# Patient Record
(demographics unavailable — no encounter records)

---

## 2025-05-14 NOTE — PLAN
[FreeTextEntry4] : PLAN: -psychoeducation about diagnosis and treatment modalities, alternatives to recommended treatment, risk Vs benefits of treatment and no treatment and alternative treatments. -Recommendation for ADOS evaluation (R/O ASD) with neuropsychology.  -parents already have a scheduled sleep study in June ; sleep hygiene reviewed , consideration for oral appliance  - recommend Behavior therapy for ADHD EF skills, consideration to add social skills group/social pragmatic skills to 504   -Medication: parents not interested in med mgmt at this time. Will discuss consideration for stimulants *MPH) to address adhd and daytime sleepiness.  -Further labs: [place] forms and sleep study. -Safety: Emergency procedures were discussed  -Patient, parent  given opportunity to ask questions and their questions were answered and they expressed understanding and agreement with above plan. -Follow up: RTC in 2-3 weeks for feedback session.        I spent a total of 60 minutes for today's visit in evaluating and treating the patient as per above, including: preparing for patient's appointment (review of prior documents); obtaining and reviewing separately obtained history; performing a medically necessary exam/evaluation; independently interpreting results (labs/Health and Behavior Assessments); communicating/counseling/educating the patient/family/caregiver;  referring to other health care professionals; documenting clinical information in the EHR

## 2025-05-14 NOTE — DISCUSSION/SUMMARY
[FreeTextEntry1] : 14 yo F with ADHD, Anxiety, and sleep concerns; Warrants r/o ASD and sleep d/o; Protective factors of supportive family and friends, looks to treatment favorably, as such with treatment adherence, prognosis is good.  [Low acute suicide risk] : Low acute suicide risk [No] : No [Not clinically indicated] : Safety Plan completed/updated (for individuals at risk): Not clinically indicated

## 2025-05-14 NOTE — RISK ASSESSMENT
[Clinical Records] : Clinical Records [Clinical Interview] : Clinical Interview [No] : No [Supportive social network of family or friends] : supportive social network of family or friends [Positive therapeutic relationships] : positive therapeutic relationships [Responsibility to children, family, or others] : responsibility to children, family, or others [Engaged in work or school] : engaged in work or school

## 2025-05-14 NOTE — REASON FOR VISIT
[Self-Referred] : Self-Referred [Patient] : Patient [Prior Medical Records] : Prior Medical Records [Collateral - Name/Contact Info/Relationship:___] : Collateral: [unfilled] [FreeTextEntry2] : adhd , anxiety evaluation  [FreeTextEntry1] : adhd , anxiety evaluation

## 2025-05-14 NOTE — SOCIAL HISTORY
[FreeTextEntry1] : lives with bio parents, only child; pets- dogs and fish mother- anesthesiologist  father- part time  and      8th grade, advanced/gifted classes

## 2025-05-14 NOTE — PAST MEDICAL HISTORY
[FreeTextEntry1] : -35 weeks; 4 lbs, 1 week NICU    -scoliosis, monitoring only at this time  -ADHD as per hpi

## 2025-05-14 NOTE — PLAN
Yes [FreeTextEntry4] : PLAN: -psychoeducation about diagnosis and treatment modalities, alternatives to recommended treatment, risk Vs benefits of treatment and no treatment and alternative treatments. -Recommendation for ADOS evaluation (R/O ASD) with neuropsychology.  -parents already have a scheduled sleep study in June ; sleep hygiene reviewed , consideration for oral appliance  - recommend Behavior therapy for ADHD EF skills, consideration to add social skills group/social pragmatic skills to 504   -Medication: parents not interested in med mgmt at this time. Will discuss consideration for stimulants *MPH) to address adhd and daytime sleepiness.  -Further labs: [place] forms and sleep study. -Safety: Emergency procedures were discussed  -Patient, parent  given opportunity to ask questions and their questions were answered and they expressed understanding and agreement with above plan. -Follow up: RTC in 2-3 weeks for feedback session.        I spent a total of 60 minutes for today's visit in evaluating and treating the patient as per above, including: preparing for patient's appointment (review of prior documents); obtaining and reviewing separately obtained history; performing a medically necessary exam/evaluation; independently interpreting results (labs/Health and Behavior Assessments); communicating/counseling/educating the patient/family/caregiver;  referring to other health care professionals; documenting clinical information in the EHR

## 2025-05-14 NOTE — DISCUSSION/SUMMARY
[FreeTextEntry1] : 12 yo F with ADHD, Anxiety, and sleep concerns; Warrants r/o ASD and sleep d/o; Protective factors of supportive family and friends, looks to treatment favorably, as such with treatment adherence, prognosis is good.  [Low acute suicide risk] : Low acute suicide risk [No] : No [Not clinically indicated] : Safety Plan completed/updated (for individuals at risk): Not clinically indicated

## 2025-05-14 NOTE — HISTORY OF PRESENT ILLNESS
[FreeTextEntry1] : Patient is a 12 yo female, domiciled with bio parents, only child, currently enrolled at [employer], 8th grade gifted/advanced classed ,  with 504 accommodations for ADHD-I,  currently not outpatient treatment, no prior psychiatric hospitalization, no self-injury or suicide attempts, no aggression/violence, no substance use, no legal issues, no CPS involvement, no trauma/abuse, no sig PMH, presenting today with parents for second opinion evaluation of adhd and anxiety.  Parents explain child is bright, however struggles with attention and organizational skills.  They find as a result with often have secondary anxiety related to missed or late assigment completion; Mother feels child needs help with motivation and ongoing reminders.  These behaviors were first noted around 6th grade.  Child was evaluated by school psychiatrist and told met "soft" ADH-I; As a results 504 was initiated with help of extra time.  Pt often attended summer school in order to take advanced classes, and performed well with less stressors over the summer; During the academic year, has difficulty organizing her time.  Socially, parents think child has some close friendships, however has had several experiences when she came home to explain she didn't understand why peers behaved in a certain way; She has a tendency to hyperfixate and learn all about a particular subject which she then speaks to friends and family about; Pt also has several sensory concerns since early childhood, didnt likeloud sounds, would put her hands over her ears during firedrills; Often would cry when felt overstimlated; Lately allowed to wear headphones prn and have a plan with the school that allows them to notify child prior to drills in order to help reduce distress.  Child recently had an assigned reading involving a character with ASD, pt felt she identified with the character and started to inquire if she too was mildly autistic, parents are open to ADOS evaluation. Intake with pt: sitting with parents wearing noise cancelling headphones, responds to instruction to take them off, pt explains she finds it "comforting", adds she has other comfort and fidgets she often uses (security blanket, stinkray theme fidgets); Pt does speak tangentially regarding her love for stingrays or [place] mythology; States she speaks incessantly about these two subjects lately with friends;  Describes she is able to maintain friendships with few close friends who have similar interests; Has often felt confused that she's not able to understand others' social cues, and prefers to wait till she comes home to clarify with parents why peers behave rude to her or speak rudely to each other or teachers.  She does need more help with math lately, however avoids going for extra help as other children present and not a structured setting, feels doesn't know how to ask for 1:1 help;  Thinks her appetite is okay and attending to ADL, does well academically; Struggles lately with sleep onset and middle insomnia, then often falling asleep in class; Has a pending appt with sleep specialist for sleep study.  Psych ROS: Pt denies hx of trauma/MDD/SIIP/self harm/psychosis/ED/OCD  Inattention Sx: Patient reports making a lot of careless mistakes, difficulty sustaining attention on tasks or play activities, told doesn't listen, difficulty following instructions, difficulty organizing tasks , dislikes/avoids tasks requiring attention , loses things ("all the time"),   easily distracted, forgetful in daily activities and chores. Hyperactivity Sx: Figets, difficulty playing quietly ,   talks excessively,  difficulty waiting turn , often interrupts or intrudes   .  ASD screen: Persistent deficits in social communication and social interaction- Yes       1. Deficits in social-emotional reciprocity (abnormal social approach,   reduced sharing of interests, emotions, affect, failure to initiate or respond to social interactions; "Problems with social interactions,  can't tell sarcasm, managing my emotions like snapping at friends"       2.Deficits in nonverbal communication used for social interaction (poorly integrated verbal and nonverbal communication, abnormalities in eye contact and body language, deficits in understanding and use of gestures) - Yes, "understand other people's social cues"  Sensory concerns- textures like micofiber,   avoid onion celery tomatoes, beans - "texture is itchy" ; uses headphones so hypersensitivity to sound, uses a comfort blanket, has transitional object fidgets; avoids any pill swallowing  [FreeTextEntry2] : 6th grade "potential ADHD - Inattentive " 8th grade ADHD-I, Anxiety unspecified    [FreeTextEntry3] : melatonin - briefly, not effective, considering trazodone however pt does not swallow pills so did not yet try.

## 2025-05-14 NOTE — PHYSICAL EXAM
[Well groomed] : well groomed [Intermittent] : intermittent [Stereotyped/Peculiar] : stereotyped/peculiar [Cooperative] : cooperative [Euthymic] : euthymic [Anxious] : anxious [Constricted] : constricted [Clear] : clear [Loud] : loud [Linear/Goal Directed] : linear/goal directed [Tangential] : tangential [[place]] : concrete [None] : none [Preoccupations/Ruminations] : preoccupations/ruminations [None Reported] : none reported [Attention/Concentration] : attention/concentration [Above average] : above average [WNL] : within normal limits [Positive interaction] : positive interaction [Unremarkable/age appropriate] : unremarkable/age appropriate [FreeTextEntry1] : fidgeting with eder  [de-identified] : periodic stands to feel wall hanging, couch fidget

## 2025-05-14 NOTE — HISTORY OF PRESENT ILLNESS
[FreeTextEntry1] : Patient is a 12 yo female, domiciled with bio parents, only child, currently enrolled at [employer], 8th grade gifted/advanced classed ,  with 504 accommodations for ADHD-I,  currently not outpatient treatment, no prior psychiatric hospitalization, no self-injury or suicide attempts, no aggression/violence, no substance use, no legal issues, no CPS involvement, no trauma/abuse, no sig PMH, presenting today with parents for second opinion evaluation of adhd and anxiety.  Parents explain child is bright, however struggles with attention and organizational skills.  They find as a result with often have secondary anxiety related to missed or late assigment completion; Mother feels child needs help with motivation and ongoing reminders.  These behaviors were first noted around 6th grade.  Child was evaluated by school psychiatrist and told met "soft" ADH-I; As a results 504 was initiated with help of extra time.  Pt often attended summer school in order to take advanced classes, and performed well with less stressors over the summer; During the academic year, has difficulty organizing her time.  Socially, parents think child has some close friendships, however has had several experiences when she came home to explain she didn't understand why peers behaved in a certain way; She has a tendency to hyperfixate and learn all about a particular subject which she then speaks to friends and family about; Pt also has several sensory concerns since early childhood, didnt likeloud sounds, would put her hands over her ears during firedrills; Often would cry when felt overstimlated; Lately allowed to wear headphones prn and have a plan with the school that allows them to notify child prior to drills in order to help reduce distress.  Child recently had an assigned reading involving a character with ASD, pt felt she identified with the character and started to inquire if she too was mildly autistic, parents are open to ADOS evaluation. Intake with pt: sitting with parents wearing noise cancelling headphones, responds to instruction to take them off, pt explains she finds it "comforting", adds she has other comfort and fidgets she often uses (security blanket, stinkray theme fidgets); Pt does speak tangentially regarding her love for stingrays or [place] mythology; States she speaks incessantly about these two subjects lately with friends;  Describes she is able to maintain friendships with few close friends who have similar interests; Has often felt confused that she's not able to understand others' social cues, and prefers to wait till she comes home to clarify with parents why peers behave rude to her or speak rudely to each other or teachers.  She does need more help with math lately, however avoids going for extra help as other children present and not a structured setting, feels doesn't know how to ask for 1:1 help;  Thinks her appetite is okay and attending to ADL, does well academically; Struggles lately with sleep onset and middle insomnia, then often falling asleep in class; Has a pending appt with sleep specialist for sleep study.  Psych ROS: Pt denies hx of trauma/MDD/SIIP/self harm/psychosis/ED/OCD  Inattention Sx: Patient reports making a lot of careless mistakes, difficulty sustaining attention on tasks or play activities, told doesn't listen, difficulty following instructions, difficulty organizing tasks , dislikes/avoids tasks requiring attention , loses things ("all the time"),   easily distracted, forgetful in daily activities and chores. Hyperactivity Sx: Figets, difficulty playing quietly ,   talks excessively,  difficulty waiting turn , often interrupts or intrudes   .  ASD screen: Persistent deficits in social communication and social interaction- Yes       1. Deficits in social-emotional reciprocity (abnormal social approach,   reduced sharing of interests, emotions, affect, failure to initiate or respond to social interactions; "Problems with social interactions,  can't tell sarcasm, managing my emotions like snapping at friends"       2.Deficits in nonverbal communication used for social interaction (poorly integrated verbal and nonverbal communication, abnormalities in eye contact and body language, deficits in understanding and use of gestures) - Yes, "understand other people's social cues"  Sensory concerns- textures like micofiber,   avoid onion celery tomatoes, beans - "texture is itchy" ; uses headphones so hypersensitivity to sound, uses a comfort blanket, has transitional object fidgets; avoids any pill swallowing  [FreeTextEntry2] : 6th grade "potential ADHD - Inattentive " 8th grade ADHD-I, Anxiety unspecified    [FreeTextEntry3] : melatonin - briefly, not effective, considering trazodone however pt does not swallow pills so did not yet try.

## 2025-05-14 NOTE — PHYSICAL EXAM
[Well groomed] : well groomed [Intermittent] : intermittent [Stereotyped/Peculiar] : stereotyped/peculiar [Cooperative] : cooperative [Euthymic] : euthymic [Anxious] : anxious [Constricted] : constricted [Clear] : clear [Loud] : loud [Linear/Goal Directed] : linear/goal directed [Tangential] : tangential [[place]] : concrete [None] : none [Preoccupations/Ruminations] : preoccupations/ruminations [None Reported] : none reported [Attention/Concentration] : attention/concentration [Above average] : above average [WNL] : within normal limits [Positive interaction] : positive interaction [Unremarkable/age appropriate] : unremarkable/age appropriate [FreeTextEntry1] : fidgeting with eder  [de-identified] : periodic stands to feel wall hanging, couch fidget

## 2025-06-09 NOTE — DISCUSSION/SUMMARY
[FreeTextEntry1] :  14 yo F with ADHD, Anxiety, and sleep concerns; Warrants r/o ASD and sleep d/o; Protective factors of supportive family and friends, looks to treatment favorably, as such with treatment adherence, prognosis is good.

## 2025-06-09 NOTE — DISCUSSION/SUMMARY
[FreeTextEntry1] :  12 yo F with ADHD, Anxiety, and sleep concerns; Warrants r/o ASD and sleep d/o; Protective factors of supportive family and friends, looks to treatment favorably, as such with treatment adherence, prognosis is good.

## 2025-06-09 NOTE — REASON FOR VISIT
[Patient with collateral] : Patient with collateral  [Parents] : parents [FreeTextEntry1] : anxiety, sleep adhd concerns  [TextEntry] :  This visit was provided via telehealth using real-time 2-way audio visual technology HIPPA compliant [facility]. Patient's identity was verified. The patient, BRITTNEY ROBLERO , was located at home, [address] [place] [address] , at the time of the visit. The provider, ALYSON RIVERA, was located at the medical office located in [place] at the time of the visit. The patient, BRITTNEY ROBLERO and Provider participated in the telehealth encounter. Parent also participated. Verbal consent given on Jun 09, 2025   by the Parent with patient BRITTNEY ROBLERO  assent.

## 2025-06-09 NOTE — HISTORY OF PRESENT ILLNESS
[FreeTextEntry2] : 6th grade "potential ADHD - Inattentive " 8th grade ADHD-I, Anxiety unspecified    [FreeTextEntry1] : Patient is a 12 yo female, domiciled with bio parents, only child, currently enrolled at [employer], 8th grade gifted/advanced classed ,  with 504 accommodations for ADHD-I,  currently not outpatient treatment, no prior psychiatric hospitalization, no self-injury or suicide attempts, no aggression/violence, no substance use, no legal issues, no CPS involvement, no trauma/abuse, no sig PMH, presenting today with parents for second opinion evaluation of adhd and anxiety.  Parents explain child is bright, however struggles with attention and organizational skills.  They find as a result with often have secondary anxiety related to missed or late assigment completion; Mother feels child needs help with motivation and ongoing reminders.  These behaviors were first noted around 6th grade.  Child was evaluated by school psychiatrist and told met "soft" ADH-I; As a results 504 was initiated with help of extra time.  Pt often attended summer school in order to take advanced classes, and performed well with less stressors over the summer; During the academic year, has difficulty organizing her time.  Socially, parents think child has some close friendships, however has had several experiences when she came home to explain she didn't understand why peers behaved in a certain way; She has a tendency to hyperfixate and learn all about a particular subject which she then speaks to friends and family about; Pt also has several sensory concerns since early childhood, didnt likeloud sounds, would put her hands over her ears during firedrills; Often would cry when felt overstimlated; Lately allowed to wear headphones prn and have a plan with the school that allows them to notify child prior to drills in order to help reduce distress.  Child recently had an assigned reading involving a character with ASD, pt felt she identified with the character and started to inquire if she too was mildly autistic, parents are open to ADOS evaluation. Intake with pt: sitting with parents wearing noise cancelling headphones, responds to instruction to take them off, pt explains she finds it "comforting", adds she has other comfort and fidgets she often uses (security blanket, stinkray theme fidgets); Pt does speak tangentially regarding her love for stingrays or [place] mythology; States she speaks incessantly about these two subjects lately with friends;  Describes she is able to maintain friendships with few close friends who have similar interests; Has often felt confused that she's not able to understand others' social cues, and prefers to wait till she comes home to clarify with parents why peers behave rude to her or speak rudely to each other or teachers.  She does need more help with math lately, however avoids going for extra help as other children present and not a structured setting, feels doesn't know how to ask for 1:1 help;  Thinks her appetite is okay and attending to ADL, does well academically; Struggles lately with sleep onset and middle insomnia, then often falling asleep in class; Has a pending appt with sleep specialist for sleep study.  Psych ROS: Pt denies hx of trauma/MDD/SIIP/self harm/psychosis/ED/OCD  Inattention Sx: Patient reports making a lot of careless mistakes, difficulty sustaining attention on tasks or play activities, told doesn't listen, difficulty following instructions, difficulty organizing tasks , dislikes/avoids tasks requiring attention , loses things ("all the time"),   easily distracted, forgetful in daily activities and chores. Hyperactivity Sx: Figets, difficulty playing quietly ,   talks excessively,  difficulty waiting turn , often interrupts or intrudes   .  ASD screen: Persistent deficits in social communication and social interaction- Yes       1. Deficits in social-emotional reciprocity (abnormal social approach,   reduced sharing of interests, emotions, affect, failure to initiate or respond to social interactions; "Problems with social interactions,  can't tell sarcasm, managing my emotions like snapping at friends"       2.Deficits in nonverbal communication used for social interaction (poorly integrated verbal and nonverbal communication, abnormalities in eye contact and body language, deficits in understanding and use of gestures) - Yes, "understand other people's social cues"  Sensory concerns- textures like micofiber,   avoid onion celery tomatoes, beans - "texture is itchy" ; uses headphones so hypersensitivity to sound, uses a comfort blanket, has transitional object fidgets; avoids any pill swallowing  [FreeTextEntry3] : melatonin - briefly, not effective, considering trazodone however pt does not swallow pills so did not yet try.

## 2025-06-09 NOTE — PLAN
[No] : No [Medication education provided] : Medication education provided. [FreeTextEntry5] : -psychoeducation about diagnosis and treatment modalities, alternatives to recommended treatment, risk Vs benefits of treatment and no treatment and alternative treatments. -Recommendation for ADOS evaluation (R/O ASD) with neuropsychology. -parents already have a scheduled sleep study in June 23rd; sleep hygiene reviewed , consideration for oral appliance - recommend Behavior therapy for ADHD EF skills, consideration to add social skills group/social pragmatic skills to 504 -Medication: parents not interested in med mgmt at this time. Will discuss consideration for stimulants *MPH) to address adhd and daytime sleepiness. -Further labs: Roque forms and sleep study. -Safety: Emergency procedures were discussed -Patient, parent given opportunity to ask questions and their questions were answered and they expressed understanding and agreement with above plan. -Follow up: RTC in 2-3 weeks for feedback session.        I spent a total of 30 minutes for today's visit in evaluating and treating the patient as per above, including:  preparing for patient's appointment (review of prior documents); obtaining and reviewing separately obtained history; performing a medically necessary exam/evaluation; communicating/counseling/educating the patient/family/caregiver; referring to other health care professionals; documenting clinical information in the EHR  [Rationale for medication choices, possible risks/precautions, benefits, alternative treatment choices, and consequences of non-treatment discussed] : Rationale for medication choices, possible risks/precautions, benefits, alternative treatment choices, and consequences of non-treatment discussed with patient/family/caregiver

## 2025-06-09 NOTE — PLAN
[Medication education provided] : Medication education provided. [No] : No [Rationale for medication choices, possible risks/precautions, benefits, alternative treatment choices, and consequences of non-treatment discussed] : Rationale for medication choices, possible risks/precautions, benefits, alternative treatment choices, and consequences of non-treatment discussed with patient/family/caregiver  [FreeTextEntry5] : -psychoeducation about diagnosis and treatment modalities, alternatives to recommended treatment, risk Vs benefits of treatment and no treatment and alternative treatments. -Recommendation for ADOS evaluation (R/O ASD) with neuropsychology. -parents already have a scheduled sleep study in June 23rd; sleep hygiene reviewed , consideration for oral appliance - recommend Behavior therapy for ADHD EF skills, consideration to add social skills group/social pragmatic skills to 504 -Medication: parents not interested in med mgmt at this time. Will discuss consideration for stimulants *MPH) to address adhd and daytime sleepiness. -Further labs: Roque forms and sleep study. -Safety: Emergency procedures were discussed -Patient, parent given opportunity to ask questions and their questions were answered and they expressed understanding and agreement with above plan. -Follow up: RTC in 2-3 weeks for feedback session.        I spent a total of 30 minutes for today's visit in evaluating and treating the patient as per above, including:  preparing for patient's appointment (review of prior documents); obtaining and reviewing separately obtained history; performing a medically necessary exam/evaluation; communicating/counseling/educating the patient/family/caregiver; referring to other health care professionals; documenting clinical information in the EHR

## 2025-06-09 NOTE — PHYSICAL EXAM
[Well groomed] : well groomed [Intermittent] : intermittent [Stereotyped/Peculiar] : stereotyped/peculiar [Cooperative] : cooperative [Euthymic] : euthymic [Anxious] : anxious [Constricted] : constricted [Loud] : loud [Clear] : clear [Linear/Goal Directed] : linear/goal directed [Tangential] : tangential [[place]] : concrete [None] : none [Preoccupations/Ruminations] : preoccupations/ruminations [None Reported] : none reported [Attention/Concentration] : attention/concentration [Above average] : above average [WNL] : within normal limits [Positive interaction] : positive interaction [Unremarkable/age appropriate] : unremarkable/age appropriate [FreeTextEntry1] : fidgeting with eder  [de-identified] : periodic stands to feel wall hanging, couch fidget

## 2025-06-09 NOTE — HISTORY OF PRESENT ILLNESS
[FreeTextEntry2] : 6th grade "potential ADHD - Inattentive " 8th grade ADHD-I, Anxiety unspecified    [FreeTextEntry1] : Patient is a 14 yo female, domiciled with bio parents, only child, currently enrolled at [employer], 8th grade gifted/advanced classed ,  with 504 accommodations for ADHD-I,  currently not outpatient treatment, no prior psychiatric hospitalization, no self-injury or suicide attempts, no aggression/violence, no substance use, no legal issues, no CPS involvement, no trauma/abuse, no sig PMH, presenting today with parents for second opinion evaluation of adhd and anxiety.  Parents explain child is bright, however struggles with attention and organizational skills.  They find as a result with often have secondary anxiety related to missed or late assigment completion; Mother feels child needs help with motivation and ongoing reminders.  These behaviors were first noted around 6th grade.  Child was evaluated by school psychiatrist and told met "soft" ADH-I; As a results 504 was initiated with help of extra time.  Pt often attended summer school in order to take advanced classes, and performed well with less stressors over the summer; During the academic year, has difficulty organizing her time.  Socially, parents think child has some close friendships, however has had several experiences when she came home to explain she didn't understand why peers behaved in a certain way; She has a tendency to hyperfixate and learn all about a particular subject which she then speaks to friends and family about; Pt also has several sensory concerns since early childhood, didnt likeloud sounds, would put her hands over her ears during firedrills; Often would cry when felt overstimlated; Lately allowed to wear headphones prn and have a plan with the school that allows them to notify child prior to drills in order to help reduce distress.  Child recently had an assigned reading involving a character with ASD, pt felt she identified with the character and started to inquire if she too was mildly autistic, parents are open to ADOS evaluation. Intake with pt: sitting with parents wearing noise cancelling headphones, responds to instruction to take them off, pt explains she finds it "comforting", adds she has other comfort and fidgets she often uses (security blanket, stinkray theme fidgets); Pt does speak tangentially regarding her love for stingrays or [place] mythology; States she speaks incessantly about these two subjects lately with friends;  Describes she is able to maintain friendships with few close friends who have similar interests; Has often felt confused that she's not able to understand others' social cues, and prefers to wait till she comes home to clarify with parents why peers behave rude to her or speak rudely to each other or teachers.  She does need more help with math lately, however avoids going for extra help as other children present and not a structured setting, feels doesn't know how to ask for 1:1 help;  Thinks her appetite is okay and attending to ADL, does well academically; Struggles lately with sleep onset and middle insomnia, then often falling asleep in class; Has a pending appt with sleep specialist for sleep study.  Psych ROS: Pt denies hx of trauma/MDD/SIIP/self harm/psychosis/ED/OCD  Inattention Sx: Patient reports making a lot of careless mistakes, difficulty sustaining attention on tasks or play activities, told doesn't listen, difficulty following instructions, difficulty organizing tasks , dislikes/avoids tasks requiring attention , loses things ("all the time"),   easily distracted, forgetful in daily activities and chores. Hyperactivity Sx: Figets, difficulty playing quietly ,   talks excessively,  difficulty waiting turn , often interrupts or intrudes   .  ASD screen: Persistent deficits in social communication and social interaction- Yes       1. Deficits in social-emotional reciprocity (abnormal social approach,   reduced sharing of interests, emotions, affect, failure to initiate or respond to social interactions; "Problems with social interactions,  can't tell sarcasm, managing my emotions like snapping at friends"       2.Deficits in nonverbal communication used for social interaction (poorly integrated verbal and nonverbal communication, abnormalities in eye contact and body language, deficits in understanding and use of gestures) - Yes, "understand other people's social cues"  Sensory concerns- textures like micofiber,   avoid onion celery tomatoes, beans - "texture is itchy" ; uses headphones so hypersensitivity to sound, uses a comfort blanket, has transitional object fidgets; avoids any pill swallowing  [FreeTextEntry3] : melatonin - briefly, not effective, considering trazodone however pt does not swallow pills so did not yet try.

## 2025-06-09 NOTE — REASON FOR VISIT
[Patient with collateral] : Patient with collateral  [Parents] : parents [FreeTextEntry1] : anxiety, sleep adhd concerns  [TextEntry] :  This visit was provided via telehealth using real-time 2-way audio visual technology HIPPA compliant [facility]. Patient's identity was verified. The patient, BRITTNEY ROBLERO , was located at home, [address] [place] [address] , at the time of the visit. The provider, ALYSON RIVERA, was located at the medical office located in [place] at the time of the visit. The patient, BRITTNEY ROBLERO and Provider participated in the telehealth encounter. Parent also participated. Verbal consent given on Jun 09, 2025   by the Parent with patient BRITTNEY ROBLERO  assent.

## 2025-06-09 NOTE — PHYSICAL EXAM
[Well groomed] : well groomed [Intermittent] : intermittent [Stereotyped/Peculiar] : stereotyped/peculiar [Cooperative] : cooperative [Euthymic] : euthymic [Anxious] : anxious [Constricted] : constricted [Clear] : clear [Loud] : loud [Linear/Goal Directed] : linear/goal directed [Tangential] : tangential [[place]] : concrete [None] : none [Preoccupations/Ruminations] : preoccupations/ruminations [None Reported] : none reported [Attention/Concentration] : attention/concentration [Above average] : above average [WNL] : within normal limits [Positive interaction] : positive interaction [Unremarkable/age appropriate] : unremarkable/age appropriate [de-identified] : periodic stands to feel wall hanging, couch fidget   [FreeTextEntry1] : fidgeting with eder
